# Patient Record
Sex: MALE | Race: WHITE | NOT HISPANIC OR LATINO | Employment: FULL TIME | URBAN - METROPOLITAN AREA
[De-identification: names, ages, dates, MRNs, and addresses within clinical notes are randomized per-mention and may not be internally consistent; named-entity substitution may affect disease eponyms.]

---

## 2021-06-06 ENCOUNTER — APPOINTMENT (OUTPATIENT)
Dept: GENERAL RADIOLOGY | Facility: CLINIC | Age: 26
End: 2021-06-06
Attending: EMERGENCY MEDICINE
Payer: OTHER MISCELLANEOUS

## 2021-06-06 ENCOUNTER — HOSPITAL ENCOUNTER (EMERGENCY)
Facility: CLINIC | Age: 26
Discharge: HOME | End: 2021-06-06
Attending: EMERGENCY MEDICINE | Admitting: EMERGENCY MEDICINE
Payer: OTHER MISCELLANEOUS

## 2021-06-06 VITALS
TEMPERATURE: 98.2 F | OXYGEN SATURATION: 99 % | SYSTOLIC BLOOD PRESSURE: 169 MMHG | RESPIRATION RATE: 16 BRPM | HEART RATE: 85 BPM | BODY MASS INDEX: 36.4 KG/M2 | HEIGHT: 71 IN | WEIGHT: 260 LBS | DIASTOLIC BLOOD PRESSURE: 81 MMHG

## 2021-06-06 DIAGNOSIS — S61.226A LACERATION OF RIGHT LITTLE FINGER WITH FOREIGN BODY WITHOUT DAMAGE TO NAIL, INITIAL ENCOUNTER: ICD-10-CM

## 2021-06-06 PROCEDURE — 99283 EMERGENCY DEPT VISIT LOW MDM: CPT | Mod: 25

## 2021-06-06 PROCEDURE — 90715 TDAP VACCINE 7 YRS/> IM: CPT | Performed by: EMERGENCY MEDICINE

## 2021-06-06 PROCEDURE — 250N000011 HC RX IP 250 OP 636: Performed by: EMERGENCY MEDICINE

## 2021-06-06 PROCEDURE — 64450 NJX AA&/STRD OTHER PN/BRANCH: CPT

## 2021-06-06 PROCEDURE — 73140 X-RAY EXAM OF FINGER(S): CPT | Mod: RT

## 2021-06-06 PROCEDURE — 90471 IMMUNIZATION ADMIN: CPT | Performed by: EMERGENCY MEDICINE

## 2021-06-06 RX ORDER — CEPHALEXIN 500 MG/1
500 CAPSULE ORAL 3 TIMES DAILY
Qty: 21 CAPSULE | Refills: 0 | Status: SHIPPED | OUTPATIENT
Start: 2021-06-06 | End: 2021-06-13

## 2021-06-06 RX ADMIN — CLOSTRIDIUM TETANI TOXOID ANTIGEN (FORMALDEHYDE INACTIVATED), CORYNEBACTERIUM DIPHTHERIAE TOXOID ANTIGEN (FORMALDEHYDE INACTIVATED), BORDETELLA PERTUSSIS TOXOID ANTIGEN (GLUTARALDEHYDE INACTIVATED), BORDETELLA PERTUSSIS FILAMENTOUS HEMAGGLUTININ ANTIGEN (FORMALDEHYDE INACTIVATED), BORDETELLA PERTUSSIS PERTACTIN ANTIGEN, AND BORDETELLA PERTUSSIS FIMBRIAE 2/3 ANTIGEN 0.5 ML: 5; 2; 2.5; 5; 3; 5 INJECTION, SUSPENSION INTRAMUSCULAR at 21:18

## 2021-06-06 ASSESSMENT — MIFFLIN-ST. JEOR: SCORE: 2181.48

## 2021-06-06 ASSESSMENT — ENCOUNTER SYMPTOMS: WOUND: 1

## 2021-06-07 NOTE — ED TRIAGE NOTES
Pt cut himself at work with metal;  States that it happened yesterday.  Pt thinks he is up to date on tetanus.

## 2021-06-07 NOTE — DISCHARGE INSTRUCTIONS
As we discussed, your wound is to old to be sutured, as it is a high risk for an infection.  Did remove the foreign body and clean it as best as we can.  You are also given antibiotics please take these.  Please come back to the ER immediately with any worsening symptoms, any worsening redness or swelling or pain that not controlled with Tylenol and Motrin.  Please be absolutely certain to follow with your primary doctor in the next 48 to 72 hours for repeat check of your finger and wound.

## 2021-06-07 NOTE — ED PROVIDER NOTES
"  History   Chief Complaint:  Hand Pain     The history is provided by the patient.      Joe Rodriguez is a 26 year old male who presents with hand pain. The patient tells us that yesterday while he was at work, as a , a piece of metal flew up and cut his right pinkie finger. Today, the finger is still bleeding and he is still having pain. The patient is left hand dominant.     To note, the patients last tetanus booster was in 2016.     Review of Systems   Skin: Positive for wound (Right pinkie finger laceration ).   All other systems reviewed and are negative.    Allergies:  The patient has no known allergies.     Medications:  The patient is not currently taking any prescribed medications.     Past Medical History:    Concussion   ACL sprain      Past Surgical History:    The patient denies past surgical history.     Family History:    The patient denies past family history.     Physical Exam     Patient Vitals for the past 24 hrs:   BP Temp Temp src Pulse Resp SpO2 Height Weight   06/06/21 1906 (!) 169/81 98.2  F (36.8  C) Temporal 85 16 99 % 1.803 m (5' 11\") 117.9 kg (260 lb)       Physical Exam  Vitals: reviewed by me  General: Pt seen on [facility], [facility], cooperative, and alert to conversation  Eyes: Tracking well, clear conjunctiva BL  ENT: MMM, midline trachea.   Lungs: No tachypnea, no accessory muscle use. No respiratory distress.   CV: Rate as above  MSK: no joint effusion.  Right pinky with radial side laceration noted distal to the DIP.  Small metallic foreign body felt and seen.  Digital nerves are intact bilaterally, full range of motion to DIP and PIP.  Skin: No rash  Neuro: Clear speech and no facial droop.  Psych: Not RIS, no e/o AH/VH      Emergency Department Course     Imaging:    XR Finger Right G/E 2 Views:  There is a 12 x 3 x 2 mm metallic foreign body within the little finger, this is along the ulnar aspect of the middle phalanx and is likely partially embedded within " the bone. No displaced acute. Reading per radiology.    XR Finger Right G/E 2 Views   Final Result   IMPRESSION: There is a 12 x 3 x 2 mm metallic foreign body within the little finger, this is along the ulnar aspect of the middle phalanx and is likely partially embedded within the bone. No displaced acute.         XR Hand Left G/E 3 Views    (Results Pending)     Procedures    PROCEDURE:  Digital Block    LOCATION:  Right Pinkie Finger     ANESTHESIA: Digital block using Lidocaine 1%, total of 3 mLs.    PROCEDURE NOTE: The patient tolerated the procedure well with good relief of discomfort. There were no immediate complications.    Emergency Department Course:    Reviewed:  I reviewed nursing notes, vitals, past medical history and care everywhere    Assessments:  2044 I obtained history and examined the patient as noted above.   2112 I rechecked the patient and explained findings.     Interventions:  2118 Tdap 0.5 mL IV     Disposition:  The patient was discharged to home.     Impression & Plan     Medical Decision Making:  Joe Rodriguez is a very pleasant 26 year old male who presents to the ER with what appears to be a laceration to his pinkie on the right side. He does work with his hands a lot and is left hand dominant. He is about 30 hours removed from the laceration, and I did not think that a traditional suture repair is indicated as it is a high risk for infection. Regardless, I do think that he requires antibiotics given the slight erythema already forming around this wound, the fact that the wound initially appeared to be quite dirty,and it is unclear if he has washed it out thoroughly. Will plan for discharge home with very clear return to ED precautions and follow up with PCP for wound check in 48 hours.     Diagnosis:    ICD-10-CM    1. Laceration of right little finger with foreign body without damage to nail, initial encounter  S61.226A        Discharge Medications:  New Prescriptions    CEPHALEXIN  (KEFLEX) 500 MG CAPSULE    Take 1 capsule (500 mg) by mouth 3 times daily for 7 days       Scribe Disclosure:  I, Law Lindsay, am serving as a scribe at 8:24 PM on 6/6/2021 to document services personally performed by Wilmar Estrada MD, based on my observations and the provider's statements to me.            Wilmar Estrada MD  06/06/21 [id number]